# Patient Record
Sex: MALE | Race: WHITE
[De-identification: names, ages, dates, MRNs, and addresses within clinical notes are randomized per-mention and may not be internally consistent; named-entity substitution may affect disease eponyms.]

---

## 2018-08-11 ENCOUNTER — HOSPITAL ENCOUNTER (EMERGENCY)
Dept: HOSPITAL 45 - C.EDB | Age: 53
Discharge: HOME | End: 2018-08-11
Payer: COMMERCIAL

## 2018-08-11 VITALS — SYSTOLIC BLOOD PRESSURE: 159 MMHG | OXYGEN SATURATION: 99 % | DIASTOLIC BLOOD PRESSURE: 84 MMHG | HEART RATE: 75 BPM

## 2018-08-11 VITALS — OXYGEN SATURATION: 94 %

## 2018-08-11 VITALS
WEIGHT: 256.18 LBS | HEIGHT: 72.01 IN | BODY MASS INDEX: 34.7 KG/M2 | WEIGHT: 256.18 LBS | BODY MASS INDEX: 34.7 KG/M2 | HEIGHT: 72.01 IN

## 2018-08-11 VITALS — TEMPERATURE: 99.86 F

## 2018-08-11 DIAGNOSIS — J18.1: ICD-10-CM

## 2018-08-11 DIAGNOSIS — A69.20: Primary | ICD-10-CM

## 2018-08-11 LAB
ALBUMIN SERPL-MCNC: 3.1 GM/DL (ref 3.4–5)
ALP SERPL-CCNC: 81 U/L (ref 45–117)
ALT SERPL-CCNC: 34 U/L (ref 12–78)
AST SERPL-CCNC: 21 U/L (ref 15–37)
BASOPHILS # BLD: 0.01 K/UL (ref 0–0.2)
BASOPHILS NFR BLD: 0.2 %
BUN SERPL-MCNC: 13 MG/DL (ref 7–18)
CALCIUM SERPL-MCNC: 8.3 MG/DL (ref 8.5–10.1)
CO2 SERPL-SCNC: 25 MMOL/L (ref 21–32)
CREAT SERPL-MCNC: 1.14 MG/DL (ref 0.6–1.4)
EOS ABS #: 0.03 K/UL (ref 0–0.5)
EOSINOPHIL NFR BLD AUTO: 256 K/UL (ref 130–400)
GLUCOSE SERPL-MCNC: 108 MG/DL (ref 70–99)
HCT VFR BLD CALC: 38.7 % (ref 42–52)
HGB BLD-MCNC: 13.5 G/DL (ref 14–18)
IG#: 0.03 K/UL (ref 0–0.02)
IMM GRANULOCYTES NFR BLD AUTO: 9.4 %
LYMPHOCYTES # BLD: 0.6 K/UL (ref 1.2–3.4)
MCH RBC QN AUTO: 31 PG (ref 25–34)
MCHC RBC AUTO-ENTMCNC: 34.9 G/DL (ref 32–36)
MCV RBC AUTO: 89 FL (ref 80–100)
MONO ABS #: 0.46 K/UL (ref 0.11–0.59)
MONOCYTES NFR BLD: 7.2 %
NEUT ABS #: 5.23 K/UL (ref 1.4–6.5)
NEUTROPHILS # BLD AUTO: 0.5 %
NEUTROPHILS NFR BLD AUTO: 82.2 %
PMV BLD AUTO: 9.5 FL (ref 7.4–10.4)
POTASSIUM SERPL-SCNC: 4.1 MMOL/L (ref 3.5–5.1)
PROT SERPL-MCNC: 7.6 GM/DL (ref 6.4–8.2)
RED CELL DISTRIBUTION WIDTH CV: 14.1 % (ref 11.5–14.5)
RED CELL DISTRIBUTION WIDTH SD: 46.4 FL (ref 36.4–46.3)
SODIUM SERPL-SCNC: 131 MMOL/L (ref 136–145)
WBC # BLD AUTO: 6.36 K/UL (ref 4.8–10.8)

## 2018-08-11 NOTE — EMERGENCY ROOM VISIT NOTE
History


Report prepared by Molly:  Erick Goddard


Under the Supervision of:  Dr. Raoul Boyle M.D.


First contact with patient:  17:07


Chief Complaint:  ILLNESS


Stated Complaint:  SICK,CHILLS





History of Present Illness


The patient is a 53 year old  male with a past medical history of a 

hip replacement and partial lung lobectomy who presents to the ED with a cc of 

a fever and chills that began about a week ago following a bug bite that 

occurred on July 20th. The patient explains the pain as a body ache and is also 

experiencing dry heaves. The patient reports that he was first put on Bactrim, 

but the site of the bite flared up again about a week ago with some of the pain 

radiating to his elbow causing soreness. The patient reports that after this 

flare up he took Bactrim and Prednisone. Positive Dry cough, and psoriasis. 

Negative for dysuria and back pain.





   Source of History:  patient


   Onset:  A week ago


   Position:  elbow (left)


   Quality:  ache


   Associated Symptoms:  + fevers, + chills, + cough, + vomiting (Dry Heaves), 

No back pain, No urinary symptoms





Review of Systems


See HPI for pertinent positives and negatives.  A total of ten systems were 

reviewed and were otherwise negative.





Family History





Patient reports no known family medical history.





Social History


Smoking Status:  Former Smoker


Marital Status:  





Current/Historical Medications


Scheduled


Doxycycline Monohydrate (Monodox), 100 MG PO BID


Sulfa/Trimethoprim (Bactrim Ds 800MG/160MG), 1 TAB PO BID





Scheduled PRN


Ondansetron Hcl (Zofran), 4 MG PO Q8H PRN for Nausea





Allergies


Coded Allergies:  


     No Known Allergies (Unverified , 10/4/10)





Physical Exam


Vital Signs











  Date Time  Temp Pulse Resp B/P (MAP) Pulse Ox O2 Delivery O2 Flow Rate FiO2


 


8/11/18 18:50 37.7 73 20 163/89 98 Room Air  


 


8/11/18 17:29     94 Room Air  


 


8/11/18 16:56 37.8 110 20 151/78 94 Room Air  











Physical Exam


GENERAL: Awake, alert, well-appearing, NAD, wearing glasses


HENT: Normocephalic, atraumatic.


EYES: Normal conjunctiva. Sclera non-icteric. PERRL. No anisocoria.


NECK: Supple. No nuchal rigidity. FROM.


RESPIRATORY: CTAB, no rhonchi, wheezing, crackles


CARDIAC: Tachycardic, regular rhythm, no MRG


ABDOMEN: Soft, NTND, BS+


MSK: No chest wall TTP, no LE edema, Good AROM and PROM


NEURO: GCS 15, CN 2-12 intact, moves all 4s on command


SKIN: no jaundice noted. Diffused erythema over left proximal UE to elbow. 

Plaque like rash over lower abdomen





Medical Decision & Procedures


ER Provider


Diagnostic Interpretation:


Radiology results as stated below per my review and radiologist interpretation: 





SINGLE VIEW CHEST





CLINICAL HISTORY:  Fever. Sepsis.





FINDINGS: An AP, portable, upright chest radiograph is compared to study dated


9/9/2010. The examination is degraded by portable technique and patient


rotation.  The heart is top normal for projection and there is atherosclerotic


calcification of the thoracic aorta. The pulmonary vasculature is noncongested.


There is patchy airspace consolidation at the left lung base. Atelectasis is


noted at the right lung base. No large pleural effusion or pneumothorax is seen.


Scattered calcified granulomas are observed. The bony thorax is grossly intact.


Degenerative changes noted in the thoracic spine.





IMPRESSION: There is patchy airspace consolidation at the left lung base typical


in appearance for pneumonia. Radiographic follow-up to resolution is


recommended.











Electronically signed by:  Logan Alvarado M.D.


8/11/2018 6:36 PM





Dictated Date/Time:  8/11/2018 6:35 PM





Laboratory Results


8/11/18 17:45








Red Blood Count 4.35, Mean Corpuscular Volume 89.0, Mean Corpuscular Hemoglobin 

31.0, Mean Corpuscular Hemoglobin Concent 34.9, Mean Platelet Volume 9.5, 

Neutrophils (%) (Auto) 82.2, Lymphocytes (%) (Auto) 9.4, Monocytes (%) (Auto) 

7.2, Eosinophils (%) (Auto) 0.5, Basophils (%) (Auto) 0.2, Neutrophils # (Auto) 

5.23, Lymphocytes # (Auto) 0.60, Monocytes # (Auto) 0.46, Eosinophils # (Auto) 

0.03, Basophils # (Auto) 0.01





8/11/18 17:45

















Test


  8/11/18


17:45 8/11/18


17:47


 


White Blood Count


  6.36 K/uL


(4.8-10.8) 


 


 


Red Blood Count


  4.35 M/uL


(4.7-6.1) 


 


 


Hemoglobin


  13.5 g/dL


(14.0-18.0) 


 


 


Hematocrit 38.7 % (42-52)  


 


Mean Corpuscular Volume


  89.0 fL


() 


 


 


Mean Corpuscular Hemoglobin


  31.0 pg


(25-34) 


 


 


Mean Corpuscular Hemoglobin


Concent 34.9 g/dl


(32-36) 


 


 


Platelet Count


  256 K/uL


(130-400) 


 


 


Mean Platelet Volume


  9.5 fL


(7.4-10.4) 


 


 


Neutrophils (%) (Auto) 82.2 %  


 


Lymphocytes (%) (Auto) 9.4 %  


 


Monocytes (%) (Auto) 7.2 %  


 


Eosinophils (%) (Auto) 0.5 %  


 


Basophils (%) (Auto) 0.2 %  


 


Neutrophils # (Auto)


  5.23 K/uL


(1.4-6.5) 


 


 


Lymphocytes # (Auto)


  0.60 K/uL


(1.2-3.4) 


 


 


Monocytes # (Auto)


  0.46 K/uL


(0.11-0.59) 


 


 


Eosinophils # (Auto)


  0.03 K/uL


(0-0.5) 


 


 


Basophils # (Auto)


  0.01 K/uL


(0-0.2) 


 


 


RDW Standard Deviation


  46.4 fL


(36.4-46.3) 


 


 


RDW Coefficient of Variation


  14.1 %


(11.5-14.5) 


 


 


Immature Granulocyte % (Auto) 0.5 %  


 


Immature Granulocyte # (Auto)


  0.03 K/uL


(0.00-0.02) 


 


 


Anion Gap


  7.0 mmol/L


(3-11) 


 


 


Est Creatinine Clear Calc


Drug Dose 96.4 ml/min 


  


 


 


Estimated GFR (


American) 83.4 


  


 


 


Estimated GFR (Non-


American 72.0 


  


 


 


BUN/Creatinine Ratio 11.2 (10-20)  


 


Calcium Level


  8.3 mg/dl


(8.5-10.1) 


 


 


Total Bilirubin


  0.4 mg/dl


(0.2-1) 


 


 


Direct Bilirubin


  0.1 mg/dl


(0-0.2) 


 


 


Aspartate Amino Transf


(AST/SGOT) 21 U/L (15-37) 


  


 


 


Alanine Aminotransferase


(ALT/SGPT) 34 U/L (12-78) 


  


 


 


Alkaline Phosphatase


  81 U/L


() 


 


 


Total Protein


  7.6 gm/dl


(6.4-8.2) 


 


 


Albumin


  3.1 gm/dl


(3.4-5.0) 


 


 


Lyme Disease IgG Antibody POS (NEG)  


 


Venous Blood pH


  


  7.48


(7.36-7.41)


 


Venous Blood Partial Pressure


CO2 


  35 mmHg


(38.0-50.0)


 


Venous Blood Partial Pressure


O2 


  61 mmHg 


 


 


Venous Blood HCO3  25 mmol/L 


 


Venous Blood Oxygen Saturation  91.7 % 


 


Venous Blood Base Excess  1.9 mEq/L 





Laboratory results reviewed by me





Medications Administered











 Medications


  (Trade)  Dose


 Ordered  Sig/Dante


 Route  Start Time


 Stop Time Status Last Admin


Dose Admin


 


 Sodium Chloride  2,000 ml @ 


 999 mls/hr  Q2H1M STAT


 IV  8/11/18 17:20


 8/11/18 19:20 DC 8/11/18 18:00


999 MLS/HR


 


 Vancomycin HCl


 2000 mg/Sodium


 Chloride  540 ml @ 


 200 mls/hr  ONE  STAT


 IV  8/11/18 17:20


 8/11/18 20:01  8/11/18 17:59


200 MLS/HR


 


 Ketorolac


 Tromethamine


  (Toradol Inj)  30 mg  NOW  STAT


 IV  8/11/18 17:20


 8/11/18 17:23 DC 8/11/18 18:00


30 MG


 


 Acetaminophen


  (Tylenol Tab)  650 mg  NOW  STAT


 PO  8/11/18 17:20


 8/11/18 17:23 DC 8/11/18 17:59


650 MG











ECG Per My Interpretation


Indication:  vomiting (Dry heaves ), weakness


Rate (beats per minute):  98


Rhythm:  normal sinus


Findings:  other (Normal intervals and normal axis)





ED Course


1714: The patient was evaluated in room C11B. A complete history and physical 

exam was performed.





1902: I reevaluated the patient and discussed lab and imaging results 





1930: I reevaluated the patient. Discussed results and discharge instructions: 

He verbalized understanding and agreement. The patient is ready for discharge.





Medical Decision


Nursing notes reviewed. Ancillary studies and prior records reviewed. 





The patient is a 53 year old  male with a past medical history of a 

hip replacement and partial lung lobectomy who presents to the ED with a cc of 

a fever and chills that began about a week ago following a bug bite that 

occurred on July 20th.





Differential diagnosis:


Etiologies such as viral syndrome, otitis, pharyngitis, pneumonia, influenza, 

meningitis, urinary tract infection, sepsis, bacteremia, cellulitis, abscess, 

MRSA infection, DVT, necrotizing fasciitis, dermatitis, drug eruption, as well 

as others were entertained..





Patient was seen and evaluated the bedside.  The patient has had a history of 

being prescribed antibiotics x2 over the last 2 weeks for possible cellulitic 

skin infection.  The patient was placed on Bactrim each time.  The patient 

believes he has some sort of bite while down at [place].  Patient has 

developed fever.  Also chills.  Patient does not look toxic in appearance.





Patient did have blood work completed along with a VBG blood work, Lyme's test, 

urinalysis and chest x-ray to rule out concomitant infection.  The patient does 

have an area of cellulitis.  The patient was given a dose of vancomycin.





Patient's blood work shows a normal white blood cell count.  Gas is normal.  

Bicarb also normal.  I do not believe that the patient requires further 

treatment as the patient did test positive for Lyme's and he may have a 

concomitant left lower lobe pneumonia.  I believe Doxy is suitable treatment.  

Curb 65 score of 0.  Patient did receive a first dose.  Believe is suitable for 

outpatient follow-up treatment at this time.  [facility] cultures were drawn 

and if they do grow something patient will receive a call back.  I did explain 

all results to the patient.  Patient was given strict follow-up, discharge, and 

return precautions.  All questions were answered.  Patient was deemed suitable 

for outpatient follow-up at this time.  Patient agreed with the plan of care 

and was safely discharged home.





Medication Reconcilliation


Current Medication List:  was personally reviewed by me





Blood Pressure Screening


Patient's blood pressure:  Elevated blood pressure


Blood pressure disposition:  Referred to PCP





Impression





 Primary Impression:  


 Acute Lyme disease


 Additional Impression:  


 Pneumonia





Scribe Attestation


The scribe's documentation has been prepared under my direction and personally 

reviewed by me in its entirety. I confirm that the note above accurately 

reflects all work, treatment, procedures, and medical decision making performed 

by me.





Departure Information


Dispostion


Home / Self-Care





Prescriptions





Ondansetron Hcl (ZOFRAN) 4 Mg Tab


4 MG PO Q8H Y for Nausea, #12 TAB


   Prov: Raoul Boyle M.D.         8/11/18 


Doxycycline Monohydrate (Monodox) 100 Mg Cap


100 MG PO BID for 21 Days, #42 CAP


   Prov: Raoul Boyle M.D.         8/11/18





Referrals


No Doctor, Assigned (PCP)





Forms


HOME CARE DOCUMENTATION FORM,                                                 

               IMPORTANT VISIT INFORMATION, WORK / SCHOOL INSTRUCTIONS





Patient Instructions


ED Lyme Disease, ED Pneumonia Adult, My [facility]





Additional Instructions





Please return to the emergency department if you have worsening or recurrent 

symptoms not amenable to at-home treatment.  Please call for a follow-up 

appointment with her primary care physician.  Please take your medications as 

prescribed.  If you have other concerns and/or complaints please feel free to 

also call your primary care physician's office or return the ED for further 

evaluation, management, and treatment.





You received narcotic or benzodiazepene medication while in the emergency room 

today. This is an addictive medication that may cause drowziness as well as 

constipation. Do not drive, operate heavy machinery, or drink alcohol under the 

influence of this medication.





You may take 600 mg Ibuprofen every 6 hours as needed for pain/fever with food 

unless told by your physician not to take NSAIDs. You may take tylenol 650 mg 

every 6 hours as needed for pain/fever unless told by your physician to not 

take it or have liver problems. You may take motrin and tylenol separately or 

at the same time. 





Take your medications as prescribed. If taking an antibiotic consider taking a 

probiotic and/or eating yogurt, but at the least, please take with food as it 

can cause upset stomach.





If culture results are not available at discharge, if they are positive for 

concern of infection, you will be informed of the results as soon as they are 

available. 





You have been examined and treated today on an emergency basis only. This is 

not a substitute for, or an effort to provide, complete comprehensive medical 

care. It is impossible to recognize and treat all injuries or illnesses in a 

single emergency department visit. It is therefore important that you follow up 

closely with [facility], your PCP, and/or your specialist(s). 

Call as soon as possible for an appointment.





Thank you for your time and consideration. I look forward to speaking with you 

again soon. Please don't hesitate to call us if you have any questions.





Problem Qualifiers








 Additional Impression:  


 Pneumonia


 Pneumonia type:  due to unspecified organism  Laterality:  left  Lung location

:  lower lobe of lung  Qualified Codes:  J18.1 - Lobar pneumonia, unspecified 

organism

## 2018-08-11 NOTE — DIAGNOSTIC IMAGING REPORT
SINGLE VIEW CHEST



CLINICAL HISTORY:  Fever. Sepsis.



FINDINGS: An AP, portable, upright chest radiograph is compared to study dated

9/9/2010. The examination is degraded by portable technique and patient

rotation.  The heart is top normal for projection and there is atherosclerotic

calcification of the thoracic aorta. The pulmonary vasculature is noncongested.

There is patchy airspace consolidation at the left lung base. Atelectasis is

noted at the right lung base. No large pleural effusion or pneumothorax is seen.

Scattered calcified granulomas are observed. The bony thorax is grossly intact.

Degenerative changes noted in the thoracic spine.



IMPRESSION: There is patchy airspace consolidation at the left lung base typical

in appearance for pneumonia. Radiographic follow-up to resolution is

recommended.







Electronically signed by:  Logan Alvarado M.D.

8/11/2018 6:36 PM



Dictated Date/Time:  8/11/2018 6:35 PM